# Patient Record
Sex: FEMALE | Race: WHITE | NOT HISPANIC OR LATINO | URBAN - METROPOLITAN AREA
[De-identification: names, ages, dates, MRNs, and addresses within clinical notes are randomized per-mention and may not be internally consistent; named-entity substitution may affect disease eponyms.]

---

## 2022-10-28 ENCOUNTER — TELEPHONE (OUTPATIENT)
Dept: PEDIATRIC ENDOCRINOLOGY | Age: 22
End: 2022-10-28

## 2022-10-28 DIAGNOSIS — R94.6 ABNORMAL RESULTS OF THYROID FUNCTION STUDIES: ICD-10-CM

## 2022-10-28 DIAGNOSIS — R94.6 ABNORMAL RESULTS OF THYROID FUNCTION STUDIES: Primary | ICD-10-CM

## 2022-10-28 RX ORDER — LEVOTHYROXINE SODIUM 112 UG/1
112 TABLET ORAL DAILY
Qty: 30 TABLET | Refills: 0 | Status: SHIPPED | OUTPATIENT
Start: 2022-10-28 | End: 2022-10-28

## 2022-10-28 RX ORDER — LEVOTHYROXINE SODIUM 112 UG/1
112 TABLET ORAL DAILY
Qty: 90 TABLET | Refills: 0 | Status: SHIPPED | OUTPATIENT
Start: 2022-10-28

## 2024-09-24 ENCOUNTER — HOSPITAL ENCOUNTER (EMERGENCY)
Facility: HOSPITAL | Age: 24
Discharge: HOME | End: 2024-09-24
Attending: EMERGENCY MEDICINE
Payer: COMMERCIAL

## 2024-09-24 VITALS
WEIGHT: 128 LBS | BODY MASS INDEX: 23.55 KG/M2 | RESPIRATION RATE: 18 BRPM | SYSTOLIC BLOOD PRESSURE: 119 MMHG | HEIGHT: 62 IN | OXYGEN SATURATION: 99 % | TEMPERATURE: 97 F | HEART RATE: 70 BPM | DIASTOLIC BLOOD PRESSURE: 79 MMHG
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
## 2024-09-24 LAB
HBV SURFACE AB SER QL: REACTIVE
HBV SURFACE AB SERPL IA-ACNC: 96.53 MIU/ML
HCV AB SERPL QL IA: NONREACTIVE

## 2024-09-24 PROCEDURE — 36415 COLL VENOUS BLD VENIPUNCTURE: CPT

## 2024-09-24 PROCEDURE — 86701 HIV-1ANTIBODY: CPT | Performed by: EMERGENCY MEDICINE

## 2024-09-24 PROCEDURE — 86803 HEPATITIS C AB TEST: CPT | Performed by: EMERGENCY MEDICINE

## 2024-09-24 PROCEDURE — 99284 EMERGENCY DEPT VISIT MOD MDM: CPT

## 2024-09-24 PROCEDURE — 87340 HEPATITIS B SURFACE AG IA: CPT | Performed by: EMERGENCY MEDICINE

## 2024-09-24 PROCEDURE — 99283 EMERGENCY DEPT VISIT LOW MDM: CPT

## 2024-09-24 PROCEDURE — 87389 HIV-1 AG W/HIV-1&-2 AB AG IA: CPT | Performed by: EMERGENCY MEDICINE

## 2024-09-24 PROCEDURE — 86706 HEP B SURFACE ANTIBODY: CPT | Performed by: EMERGENCY MEDICINE

## 2024-09-24 RX ORDER — METHYLPREDNISOLONE 4 MG/1
4 TABLET ORAL DAILY
COMMUNITY

## 2024-09-24 NOTE — ED INITIAL ASSESSMENT (HPI)
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 Denies any pain at this time. States is not a work place injury.

## 2024-09-24 NOTE — ED PROVIDER NOTES
Patient Seen in: 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  Wash the area immediately, presents for further evaluation.  Source patient was no longer at the pharmacy, they are attempting to contact the source patient.  No other injuries or complaints.  Patient states all of her immunizations are up-to-date.    Objective:   Past Medical History:    PCOS (polycystic ovarian syndrome)    Scoliosis              Past Surgical History:   Procedure Laterality Date    Other accessory      scoliosis surgery                Social History     Socioeconomic History    Marital status: Single   Tobacco Use    Smoking status: Never    Smokeless tobacco: Never              Review of Systems    Positive for stated Chief Complaint: Exposure,Chem Occupational    Other systems are as noted in HPI.  Constitutional and vital signs reviewed.      All other systems reviewed and negative except as noted above.    Physical Exam     ED Triage Vitals [09/24/24 1159]   /79   Pulse 70   Resp 18   Temp 97.1 °F (36.2 °C)   Temp src Temporal   SpO2 99 %   O2 Device None (Room air)       Current Vitals:   Vital Signs  BP: 119/79  Pulse: 70  Resp: 18  Temp: 97.1 °F (36.2 °C)  Temp src: Temporal    Oxygen Therapy  SpO2: 99 %  O2 Device: None (Room air)            Physical Exam  Vitals signs and nursing note reviewed.   General: Well-appearing young woman sitting in bed in no acute distress  Head: Normocephalic and atraumatic.   Pulmonary:  Breathing comfortably, no respiratory distress.  Neurological: Awake alert, speech is normal    ED Course     Labs Reviewed   HCV ANTIBODY -  Normal   HIV AG AB COMBO - Normal   EXPOSED INDIVIDUAL/EMPLOYEE PANEL    Narrative:     The following orders were created for panel order EXPOSED INDIVIDUAL/EMPLOYEE PANEL.  Procedure                               Abnormality         Status                     ---------                               -----------         ------                     Hepatitis B Surface Anti...[[id number]]                      Final result               HCV Antibody[[id number]]                 Normal              Final result               HIV Ag/Ab Combo[[id number]]              Normal              Final result                 Please view results for these tests on the individual orders.   HEPATITIS B SURFACE ANTIBODY                      MDM      23-year-old woman here for evaluation of needlestick injury.  Differential includes needlestick injury, infectious disease transmission.  Discussed with the patient the risk of hepatitis C as well as HIV transmission, rather low from IM injection site.  Patient considering HIV prophylaxis.  Discussed case with Dr. Perez from ID service, believes likely a low risk encounter given IM injection, however if patient did want to go forward with prophylaxis postexposure, would recommend Biktarvy for 28 days.  Discussed this with the patient, she has declined at this time but understands that she is 72 hours to start prophylactic treatment if she should change her mind.  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  Her baseline labs have been sent.                                 Medical Decision Making      Disposition and Plan     Clinical Impression:  1. Accidental needlestick injury with exposure to body fluid         Disposition:  Discharge  9/24/2024  2:19 pm    Follow-up:  Bhargavi Doll  [address] [place] [address]  [phone]    Follow up  Follow-up with your PMD for  reevaluation in 24 to 48 hours.  Return to ER if symptoms worsen or change or if any other new concerns.      Follow-up with the student health office at your [facility].          Medications Prescribed:  Discharge Medication List as of 9/24/2024  2:21 PM